# Patient Record
Sex: FEMALE | Race: BLACK OR AFRICAN AMERICAN | NOT HISPANIC OR LATINO | Employment: FULL TIME | URBAN - METROPOLITAN AREA
[De-identification: names, ages, dates, MRNs, and addresses within clinical notes are randomized per-mention and may not be internally consistent; named-entity substitution may affect disease eponyms.]

---

## 2018-12-12 PROBLEM — F51.01 PRIMARY INSOMNIA: Status: ACTIVE | Noted: 2018-12-12

## 2018-12-12 PROBLEM — N76.0 ACUTE VAGINITIS: Status: ACTIVE | Noted: 2018-12-12

## 2019-06-21 PROBLEM — Z00.00 ROUTINE GENERAL MEDICAL EXAMINATION AT A HEALTH CARE FACILITY: Status: ACTIVE | Noted: 2019-06-21

## 2019-06-21 PROBLEM — E88.810 INSULIN RESISTANCE SYNDROME: Status: ACTIVE | Noted: 2019-06-21

## 2019-06-21 PROBLEM — E55.9 VITAMIN D DEFICIENCY: Status: ACTIVE | Noted: 2019-06-21

## 2019-08-09 PROBLEM — R60.0 LOCALIZED EDEMA: Status: ACTIVE | Noted: 2019-08-09

## 2019-09-23 PROBLEM — Z00.00 ROUTINE GENERAL MEDICAL EXAMINATION AT A HEALTH CARE FACILITY: Status: RESOLVED | Noted: 2019-06-21 | Resolved: 2019-09-23

## 2021-11-16 ENCOUNTER — TELEPHONE (OUTPATIENT)
Dept: PULMONOLOGY | Facility: CLINIC | Age: 46
End: 2021-11-16
Payer: COMMERCIAL

## 2021-11-16 DIAGNOSIS — R05.9 COUGH: Primary | ICD-10-CM

## 2021-11-17 ENCOUNTER — HOSPITAL ENCOUNTER (OUTPATIENT)
Dept: RADIOLOGY | Facility: HOSPITAL | Age: 46
Discharge: HOME | End: 2021-11-17
Attending: INTERNAL MEDICINE
Payer: COMMERCIAL

## 2021-11-17 ENCOUNTER — OFFICE VISIT (OUTPATIENT)
Dept: PULMONOLOGY | Facility: CLINIC | Age: 46
End: 2021-11-17
Payer: COMMERCIAL

## 2021-11-17 VITALS
BODY MASS INDEX: 45.99 KG/M2 | OXYGEN SATURATION: 94 % | WEIGHT: 293 LBS | DIASTOLIC BLOOD PRESSURE: 86 MMHG | RESPIRATION RATE: 16 BRPM | SYSTOLIC BLOOD PRESSURE: 140 MMHG | HEIGHT: 67 IN | HEART RATE: 104 BPM

## 2021-11-17 DIAGNOSIS — G47.33 OSA (OBSTRUCTIVE SLEEP APNEA): Primary | ICD-10-CM

## 2021-11-17 DIAGNOSIS — E03.9 HYPOTHYROIDISM, UNSPECIFIED TYPE: ICD-10-CM

## 2021-11-17 DIAGNOSIS — E66.01 MORBID OBESITY: ICD-10-CM

## 2021-11-17 DIAGNOSIS — E88.810 INSULIN RESISTANCE SYNDROME: ICD-10-CM

## 2021-11-17 DIAGNOSIS — F51.01 PRIMARY INSOMNIA: ICD-10-CM

## 2021-11-17 DIAGNOSIS — R05.9 COUGH: ICD-10-CM

## 2021-11-17 PROCEDURE — 99999 PR PBB SHADOW E&M-EST. PATIENT-LVL V: ICD-10-PCS | Mod: PBBFAC,,, | Performed by: INTERNAL MEDICINE

## 2021-11-17 PROCEDURE — 99204 PR OFFICE/OUTPT VISIT, NEW, LEVL IV, 45-59 MIN: ICD-10-PCS | Mod: S$GLB,,, | Performed by: INTERNAL MEDICINE

## 2021-11-17 PROCEDURE — 71046 XR CHEST PA AND LATERAL: ICD-10-PCS | Mod: 26,,, | Performed by: RADIOLOGY

## 2021-11-17 PROCEDURE — 3077F PR MOST RECENT SYSTOLIC BLOOD PRESSURE >= 140 MM HG: ICD-10-PCS | Mod: CPTII,S$GLB,, | Performed by: INTERNAL MEDICINE

## 2021-11-17 PROCEDURE — 3061F PR NEG MICROALBUMINURIA RESULT DOCUMENTED/REVIEW: ICD-10-PCS | Mod: CPTII,S$GLB,, | Performed by: INTERNAL MEDICINE

## 2021-11-17 PROCEDURE — 1160F PR REVIEW ALL MEDS BY PRESCRIBER/CLIN PHARMACIST DOCUMENTED: ICD-10-PCS | Mod: CPTII,S$GLB,, | Performed by: INTERNAL MEDICINE

## 2021-11-17 PROCEDURE — 99204 OFFICE O/P NEW MOD 45 MIN: CPT | Mod: S$GLB,,, | Performed by: INTERNAL MEDICINE

## 2021-11-17 PROCEDURE — 1160F RVW MEDS BY RX/DR IN RCRD: CPT | Mod: CPTII,S$GLB,, | Performed by: INTERNAL MEDICINE

## 2021-11-17 PROCEDURE — 3079F DIAST BP 80-89 MM HG: CPT | Mod: CPTII,S$GLB,, | Performed by: INTERNAL MEDICINE

## 2021-11-17 PROCEDURE — 71046 X-RAY EXAM CHEST 2 VIEWS: CPT | Mod: TC

## 2021-11-17 PROCEDURE — 99999 PR PBB SHADOW E&M-EST. PATIENT-LVL V: CPT | Mod: PBBFAC,,, | Performed by: INTERNAL MEDICINE

## 2021-11-17 PROCEDURE — 71046 X-RAY EXAM CHEST 2 VIEWS: CPT | Mod: 26,,, | Performed by: RADIOLOGY

## 2021-11-17 PROCEDURE — 3066F PR DOCUMENTATION OF TREATMENT FOR NEPHROPATHY: ICD-10-PCS | Mod: CPTII,S$GLB,, | Performed by: INTERNAL MEDICINE

## 2021-11-17 PROCEDURE — 3008F BODY MASS INDEX DOCD: CPT | Mod: CPTII,S$GLB,, | Performed by: INTERNAL MEDICINE

## 2021-11-17 PROCEDURE — 1159F PR MEDICATION LIST DOCUMENTED IN MEDICAL RECORD: ICD-10-PCS | Mod: CPTII,S$GLB,, | Performed by: INTERNAL MEDICINE

## 2021-11-17 PROCEDURE — 3079F PR MOST RECENT DIASTOLIC BLOOD PRESSURE 80-89 MM HG: ICD-10-PCS | Mod: CPTII,S$GLB,, | Performed by: INTERNAL MEDICINE

## 2021-11-17 PROCEDURE — 1159F MED LIST DOCD IN RCRD: CPT | Mod: CPTII,S$GLB,, | Performed by: INTERNAL MEDICINE

## 2021-11-17 PROCEDURE — 3061F NEG MICROALBUMINURIA REV: CPT | Mod: CPTII,S$GLB,, | Performed by: INTERNAL MEDICINE

## 2021-11-17 PROCEDURE — 3008F PR BODY MASS INDEX (BMI) DOCUMENTED: ICD-10-PCS | Mod: CPTII,S$GLB,, | Performed by: INTERNAL MEDICINE

## 2021-11-17 PROCEDURE — 3066F NEPHROPATHY DOC TX: CPT | Mod: CPTII,S$GLB,, | Performed by: INTERNAL MEDICINE

## 2021-11-17 PROCEDURE — 3077F SYST BP >= 140 MM HG: CPT | Mod: CPTII,S$GLB,, | Performed by: INTERNAL MEDICINE

## 2021-11-18 ENCOUNTER — TELEPHONE (OUTPATIENT)
Dept: SLEEP MEDICINE | Facility: CLINIC | Age: 46
End: 2021-11-18
Payer: COMMERCIAL

## 2021-12-15 ENCOUNTER — PROCEDURE VISIT (OUTPATIENT)
Dept: SLEEP MEDICINE | Facility: CLINIC | Age: 46
End: 2021-12-15
Payer: COMMERCIAL

## 2021-12-15 DIAGNOSIS — G47.33 OSA (OBSTRUCTIVE SLEEP APNEA): ICD-10-CM

## 2021-12-19 ENCOUNTER — PATIENT MESSAGE (OUTPATIENT)
Dept: PULMONOLOGY | Facility: CLINIC | Age: 46
End: 2021-12-19
Payer: COMMERCIAL

## 2021-12-19 PROCEDURE — 95806 SLEEP STUDY UNATT&RESP EFFT: CPT | Mod: 26,S$GLB,, | Performed by: INTERNAL MEDICINE

## 2021-12-19 PROCEDURE — 95806 PR SLEEP STUDY, UNATTENDED, SIMUL RECORD HR/O2 SAT/RESP FLOW/RESP EFFT: ICD-10-PCS | Mod: 26,S$GLB,, | Performed by: INTERNAL MEDICINE

## 2021-12-20 ENCOUNTER — TELEPHONE (OUTPATIENT)
Dept: PULMONOLOGY | Facility: CLINIC | Age: 46
End: 2021-12-20
Payer: COMMERCIAL

## 2021-12-20 ENCOUNTER — PATIENT MESSAGE (OUTPATIENT)
Dept: PULMONOLOGY | Facility: CLINIC | Age: 46
End: 2021-12-20
Payer: COMMERCIAL

## 2021-12-20 DIAGNOSIS — G47.33 OSA (OBSTRUCTIVE SLEEP APNEA): Primary | ICD-10-CM

## 2021-12-20 NOTE — TELEPHONE ENCOUNTER
Orders Placed This Encounter   Procedures    [facility] Patient Entered CPAP Usage     Order Specific Question:   Patient consents to share Tuan CPAP usage and settings data with [facility]?     Answer:   Yes    CPAP FOR HOME USE     Order Specific Question:   Length of need (1-99 months):     Answer:   99     Order Specific Question:   Fulfillment Priority:     Answer:   Level 4:  all others     Comments:   AHI 37.7     Order Specific Question:   Type ():     Answer:   Auto CPAP     Order Specific Question:   Auto CPAP pressure setting range (cmH20):     Answer:   6-20     Order Specific Question:   Humidification ():     Answer:   Heated     Order Specific Question:   Choose ONE mask type and its corresponding cushions and/or pillows:     Answer:    Nasal Cushion Mask, 1 per 90 days:  Nasal Cushions, (6 per 90 days)     Order Specific Question:   Choose EITHER Heated or Non-Heated Tubjing     Answer:    Non-Heated Tubing, 1 per 90 days     Order Specific Question:   All other supplies as needed as listed below:     Answer:    Headgear, 1 per 180 days     Order Specific Question:   All other supplies as needed as listed below:     Answer:    Chin Strap, 1 per 180 days     Order Specific Question:   All other supplies as needed as listed below:     Answer:    Disposable Filter, 6 per 90 days     Order Specific Question:   All other supplies as needed as listed below:     Answer:    Non-Disposable Filter, 1 per 180 days     Order Specific Question:   All other supplies as needed as listed below:     Answer:    Humidifier Chamber, 1 per 180 days    CPAP/BIPAP SUPPLIES     Order Specific Question:   Length of need (1-99 months):     Answer:   99     Order Specific Question:   Choose ONE mask type and its corresponding cushions and/or pillows:     Answer:    Nasal Cushion Mask, 1 per 90 days:  Nasal Cushions, (6 per 90 days)     Order Specific Question:    Choose EITHER Heated or Non-Heated Tubjing     Answer:    Non-Heated Tubing, 1 per 90 days     Order Specific Question:   All other supplies as needed as listed below:     Answer:    Headgear, 1 per 180 days     Order Specific Question:   All other supplies as needed as listed below:     Answer:    Chin Strap, 1 per 180 days     Order Specific Question:   All other supplies as needed as listed below:     Answer:    Disposable Filter, 6 per 90 days     Order Specific Question:   All other supplies as needed as listed below:     Answer:    Non-Disposable Filter, 1 per 180 days     Order Specific Question:   All other supplies as needed as listed below:     Answer:    Humidifier Chamber, 1 per 180 days

## 2022-02-16 ENCOUNTER — PATIENT MESSAGE (OUTPATIENT)
Dept: PULMONOLOGY | Facility: CLINIC | Age: 47
End: 2022-02-16
Payer: COMMERCIAL

## 2022-02-23 DIAGNOSIS — D84.9 IMMUNOSUPPRESSED STATUS: ICD-10-CM

## 2022-03-22 ENCOUNTER — TELEPHONE (OUTPATIENT)
Dept: PULMONOLOGY | Facility: CLINIC | Age: 47
End: 2022-03-22
Payer: COMMERCIAL

## 2022-03-22 ENCOUNTER — PATIENT MESSAGE (OUTPATIENT)
Dept: PULMONOLOGY | Facility: CLINIC | Age: 47
End: 2022-03-22
Payer: COMMERCIAL

## 2022-05-24 ENCOUNTER — OFFICE VISIT (OUTPATIENT)
Dept: PULMONOLOGY | Facility: CLINIC | Age: 47
End: 2022-05-24
Payer: COMMERCIAL

## 2022-05-24 VITALS
SYSTOLIC BLOOD PRESSURE: 140 MMHG | DIASTOLIC BLOOD PRESSURE: 82 MMHG | BODY MASS INDEX: 45.99 KG/M2 | RESPIRATION RATE: 19 BRPM | HEART RATE: 82 BPM | WEIGHT: 293 LBS | HEIGHT: 67 IN | OXYGEN SATURATION: 94 %

## 2022-05-24 DIAGNOSIS — E03.8 OTHER SPECIFIED HYPOTHYROIDISM: ICD-10-CM

## 2022-05-24 DIAGNOSIS — E66.01 MORBID OBESITY: ICD-10-CM

## 2022-05-24 DIAGNOSIS — G47.33 OSA (OBSTRUCTIVE SLEEP APNEA): ICD-10-CM

## 2022-05-24 DIAGNOSIS — G47.33 OSA ON CPAP: Primary | ICD-10-CM

## 2022-05-24 PROCEDURE — 3077F PR MOST RECENT SYSTOLIC BLOOD PRESSURE >= 140 MM HG: ICD-10-PCS | Mod: CPTII,S$GLB,, | Performed by: PHYSICIAN ASSISTANT

## 2022-05-24 PROCEDURE — 3079F PR MOST RECENT DIASTOLIC BLOOD PRESSURE 80-89 MM HG: ICD-10-PCS | Mod: CPTII,S$GLB,, | Performed by: PHYSICIAN ASSISTANT

## 2022-05-24 PROCEDURE — 1160F RVW MEDS BY RX/DR IN RCRD: CPT | Mod: CPTII,S$GLB,, | Performed by: PHYSICIAN ASSISTANT

## 2022-05-24 PROCEDURE — 1159F PR MEDICATION LIST DOCUMENTED IN MEDICAL RECORD: ICD-10-PCS | Mod: CPTII,S$GLB,, | Performed by: PHYSICIAN ASSISTANT

## 2022-05-24 PROCEDURE — 3066F NEPHROPATHY DOC TX: CPT | Mod: CPTII,S$GLB,, | Performed by: PHYSICIAN ASSISTANT

## 2022-05-24 PROCEDURE — 3077F SYST BP >= 140 MM HG: CPT | Mod: CPTII,S$GLB,, | Performed by: PHYSICIAN ASSISTANT

## 2022-05-24 PROCEDURE — 3061F NEG MICROALBUMINURIA REV: CPT | Mod: CPTII,S$GLB,, | Performed by: PHYSICIAN ASSISTANT

## 2022-05-24 PROCEDURE — 99214 OFFICE O/P EST MOD 30 MIN: CPT | Mod: S$GLB,,, | Performed by: PHYSICIAN ASSISTANT

## 2022-05-24 PROCEDURE — 1160F PR REVIEW ALL MEDS BY PRESCRIBER/CLIN PHARMACIST DOCUMENTED: ICD-10-PCS | Mod: CPTII,S$GLB,, | Performed by: PHYSICIAN ASSISTANT

## 2022-05-24 PROCEDURE — 3008F PR BODY MASS INDEX (BMI) DOCUMENTED: ICD-10-PCS | Mod: CPTII,S$GLB,, | Performed by: PHYSICIAN ASSISTANT

## 2022-05-24 PROCEDURE — 3079F DIAST BP 80-89 MM HG: CPT | Mod: CPTII,S$GLB,, | Performed by: PHYSICIAN ASSISTANT

## 2022-05-24 PROCEDURE — 3008F BODY MASS INDEX DOCD: CPT | Mod: CPTII,S$GLB,, | Performed by: PHYSICIAN ASSISTANT

## 2022-05-24 PROCEDURE — 3061F PR NEG MICROALBUMINURIA RESULT DOCUMENTED/REVIEW: ICD-10-PCS | Mod: CPTII,S$GLB,, | Performed by: PHYSICIAN ASSISTANT

## 2022-05-24 PROCEDURE — 3066F PR DOCUMENTATION OF TREATMENT FOR NEPHROPATHY: ICD-10-PCS | Mod: CPTII,S$GLB,, | Performed by: PHYSICIAN ASSISTANT

## 2022-05-24 PROCEDURE — 1159F MED LIST DOCD IN RCRD: CPT | Mod: CPTII,S$GLB,, | Performed by: PHYSICIAN ASSISTANT

## 2022-05-24 PROCEDURE — 99999 PR PBB SHADOW E&M-EST. PATIENT-LVL IV: ICD-10-PCS | Mod: PBBFAC,,, | Performed by: PHYSICIAN ASSISTANT

## 2022-05-24 PROCEDURE — 99999 PR PBB SHADOW E&M-EST. PATIENT-LVL IV: CPT | Mod: PBBFAC,,, | Performed by: PHYSICIAN ASSISTANT

## 2022-05-24 PROCEDURE — 99214 PR OFFICE/OUTPT VISIT, EST, LEVL IV, 30-39 MIN: ICD-10-PCS | Mod: S$GLB,,, | Performed by: PHYSICIAN ASSISTANT

## 2022-05-24 NOTE — PROGRESS NOTES
Subjective:       Patient ID: Jolene Monaco is a 46 y.o. female.    Chief Complaint: Sleep Apnea    47yo female here for PERRI on CPAP follow up  Using AutoPAP, FFM  Patient states improved symptoms with use of CPAP. Sleeping more soundly. Waking up feeling more refreshed. Improved daytime sleepiness.  Using nightly  Wants to switch to nasal pillow  definitely wants to keep using CPAP  Mission improved from 21 to 0    11/2021  Jolene Monaco is 46 y.o.  Referred by Minal Isabel MD for PERRI/sleep eval  Patient complains of tossing and turning at night, fragmented sleep  Complains of daytime somnolence, fatigue, snoring  Takes lunesta for sleep and recently added on trazodone after father passed and symptoms worsened  In bed at 10pm and up at 5am  No restless legs  No suspicion for narcolepsy  No alcohol  Patient is a highschool teacher     BP Readings from Last 3 Encounters:   05/24/22 (!) 140/82   11/17/21 (!) 140/86   10/19/21 136/88     Snoring / Sleep:     [place] Questionnaire (validated PERRI screening questionnaire)    yes -- Snoring/apnea    yes -- Fatigue    Body mass index is 56.97 kg/m².  (>25 is overweight, >30 is obese)    Blood Pressure = Hypertension  (PreHTN 120-139/80-89, Stg1 140-159/90-99, Stg2 >160/>100)  [place] = 3 of three PERRI categories are positive (high risk is 2-3 positive categories)     [place] Sleepiness Scale TOTAL = 0    (validated sleepiness questionnaire with a higher score indicating greater sleepiness; range 0-24)  EPWORTH SLEEPINESS SCALE 5/24/2022   Sitting and reading 0   Watching TV 0   Sitting, inactive in a public place (e.g. a theatre or a meeting) 0   As a passenger in a car for an hour without a break 0   Lying down to rest in the afternoon when circumstances permit 0   Sitting and talking to someone 0   Sitting quietly after a lunch without alcohol 0   In a car, while stopped for a few minutes in traffic 0   Total score 0         Immunization History   Administered  Date(s) Administered    COVID-19, MRNA, LN-S, PF (Pfizer) (Purple Cap) 05/15/2021, 06/15/2021    DTP 1974, 08/13/1975, 10/06/1975, 09/28/1977, 11/09/1978    Influenza - Quadrivalent 10/29/2020    MMR 03/17/1977, 12/23/2004    OPV 1974, 08/13/1975, 10/06/1975, 09/28/1977, 11/09/1978    Tdap 12/23/2004     Tobacco Use: Low Risk     Smoking Tobacco Use: Never Smoker    Smokeless Tobacco Use: Never Used     Active Ambulatory Problems     Diagnosis Date Noted    Primary insomnia 12/12/2018    Acute vaginitis 12/12/2018    Chronic lymphocytic thyroiditis 06/12/2012    Hypothyroidism 06/12/2012    Iron deficiency anemia 06/12/2012    Morbid obesity 06/12/2012    PUD (peptic ulcer disease) 06/12/2012    Insulin resistance syndrome 06/21/2019    Vitamin D deficiency 06/21/2019    Localized edema 08/09/2019    PERRI on CPAP 11/17/2021     Resolved Ambulatory Problems     Diagnosis Date Noted    No Resolved Ambulatory Problems     Past Medical History:   Diagnosis Date    Insulin resistance      Current Outpatient Medications on File Prior to Visit   Medication Sig Dispense Refill    benzonatate (TESSALON) 200 MG capsule TAKE 1 CAPSULE BY MOUTH THREE TIMES DAILY FOR 10 DAYS AS NEEDED FOR COUGH      cyclobenzaprine (FLEXERIL) 10 MG tablet TAKE 1 TABLET BY MOUTH 3 TIMES DAILY AS NEEDED FOR MUSCLE SPASMS FOR UP TO 5 DAYS      desonide 0.05% (DESOWEN) 0.05 % Oint APPLY TOPICALLY TWICE DAILY AS NEEDED 60 g 2    dorzolamide-timolol 2-0.5% (COSOPT) 22.3-6.8 mg/mL ophthalmic solution Place 1 drop into both eyes 2 (two) times a day.      ergocalciferol (ERGOCALCIFEROL) 50,000 unit Cap Take 1 capsule (50,000 Units total) by mouth every 7 days. 12 capsule 1    eszopiclone (LUNESTA) 2 MG Tab Take 1 tablet (2 mg total) by mouth every evening. 30 tablet 0    fexofenadine (ALLEGRA) 180 MG tablet Take 180 mg by mouth daily as needed.      fluticasone (FLONASE) 50 mcg/actuation nasal spray 1 spray by Each  "Nare route daily as needed.      GLUCOPHAGE  mg ER 24hr tablet 2 tabs daily with meals. 60 tablet 3    levocetirizine (XYZAL) 5 MG tablet Take 5 mg by mouth once daily.      meloxicam (MOBIC) 7.5 MG tablet Take 7.5 mg by mouth once daily.      OZEMPIC 0.25 mg or 0.5 mg(2 mg/1.5 mL) PnIj       prednisoLONE acetate (PRED FORTE) 1 % DrpS INT 1 GTT IN OD QID  6    SYNTHROID 200 mcg tablet       traZODone (DESYREL) 50 MG tablet Take 1 tablet (50 mg total) by mouth nightly as needed for Insomnia. 30 tablet 3     No current facility-administered medications on file prior to visit.     Review of Systems   Constitutional: Negative for fever, weight loss, appetite change, fatigue and weakness.   HENT: Negative for trouble swallowing and congestion.    Respiratory: Negative for apnea, snoring, cough, sputum production, choking, chest tightness, shortness of breath, wheezing, dyspnea on extertion and somnolence.    Cardiovascular: Negative for chest pain and leg swelling.   Musculoskeletal: Negative for gait problem and joint swelling.   Gastrointestinal: Negative for nausea, vomiting and abdominal pain.   Neurological: Negative for dizziness, weakness and headaches.   All other systems reviewed and are negative.      Objective:       Vitals:    05/24/22 1501   BP: (!) 140/82   Pulse: 82   Resp: 19   SpO2: (!) 94%   Weight: (!) 165 kg (363 lb 12.1 oz)   Height: 5' 7" (1.702 m)     Physical Exam   Constitutional: She is oriented to person, place, and time. She appears well-developed and well-nourished. No distress. She is obese.   HENT:   Head: Normocephalic.   Nose: Nose normal.   Mouth/Throat: Oropharynx is clear and moist. Mallampati Score: III.   Neck:   Neck circumference 16.5inch   Cardiovascular: Normal rate and regular rhythm.   Pulmonary/Chest: Effort normal and breath sounds normal. No respiratory distress. She has no wheezes. She has no rhonchi. She has no rales.   Musculoskeletal:         General: No " edema.      Cervical back: Normal range of motion and neck supple.   Lymphadenopathy: No supraclavicular adenopathy is present.     She has no cervical adenopathy.     She has no axillary adenopathy.   Neurological: She is alert and oriented to person, place, and time. Gait normal.   Skin: Skin is warm and dry.   Psychiatric: She has a normal mood and affect.   Vitals reviewed.    EXAMINATION:  XR CHEST PA AND LATERAL     CLINICAL HISTORY:  Cough, unspecified     TECHNIQUE:  PA and lateral views of the chest were performed.     COMPARISON:  None     FINDINGS:  Lungs are clear without focal consolidation, edema, or mass.  No pneumothorax or pleural effusion.  Mild cardiomegaly.  Pulmonary vasculature is mildly prominent bilaterally.  No acute osseous abnormality.     Impression:     As above.        Electronically signed by: Marvel Guadarrama  Date:                                            11/17/2021  Time:                                           16:44  I personally reviewed xray imaging and agree with radiology report. No acute abnormalities noted.    Assessment:       Problem List Items Addressed This Visit        Endocrine    Hypothyroidism     Synthroid             Morbid obesity     Weight loss and exercise to improve overall health.                Other    PERRI on CPAP - Primary     Benefits from use and compliant  AHI 3.1 with use  Nasal pillow ordered  Discussed therapeutic goals for CPAP: Ideal usage 100% of nights for 6-8 hours per night. Minimum usage is 70% of night for at least 4 hours per night.             Relevant Orders    CPAP/BIPAP SUPPLIES            Plan:         Follow up in about 1 year (around 5/24/2023) for PERRI follow up.    This note was prepared using voice recognition system and is likely to have sound alike errors that may have been overlooked even after proof reading.  Please call me with any questions    Discussed diagnosis, its evaluation, treatment and usual course. All questions  answered.    Thank you for the courtesy of participating in the care of this patient    Herminia Millan PA-C

## 2022-05-24 NOTE — ASSESSMENT & PLAN NOTE
Benefits from use and compliant  AHI 3.1 with use  Nasal pillow ordered  Discussed therapeutic goals for CPAP: Ideal usage 100% of nights for 6-8 hours per night. Minimum usage is 70% of night for at least 4 hours per night.

## 2022-06-02 ENCOUNTER — PATIENT MESSAGE (OUTPATIENT)
Dept: PULMONOLOGY | Facility: CLINIC | Age: 47
End: 2022-06-02
Payer: COMMERCIAL

## 2023-03-22 PROBLEM — Z98.890 STATUS POST DILATION AND CURETTAGE: Status: ACTIVE | Noted: 2023-03-22

## 2023-03-22 PROBLEM — Z98.890 STATUS POST ENDOMETRIAL ABLATION: Status: ACTIVE | Noted: 2023-03-22

## 2023-04-12 ENCOUNTER — PATIENT MESSAGE (OUTPATIENT)
Dept: GASTROENTEROLOGY | Facility: CLINIC | Age: 48
End: 2023-04-12
Payer: COMMERCIAL

## 2023-05-31 ENCOUNTER — OFFICE VISIT (OUTPATIENT)
Dept: GASTROENTEROLOGY | Facility: CLINIC | Age: 48
End: 2023-05-31
Payer: COMMERCIAL

## 2023-05-31 VITALS
SYSTOLIC BLOOD PRESSURE: 138 MMHG | HEIGHT: 66 IN | DIASTOLIC BLOOD PRESSURE: 72 MMHG | WEIGHT: 293 LBS | BODY MASS INDEX: 47.09 KG/M2

## 2023-05-31 DIAGNOSIS — R06.02 SHORTNESS OF BREATH: ICD-10-CM

## 2023-05-31 DIAGNOSIS — R07.9 CHEST PAIN, UNSPECIFIED TYPE: Primary | ICD-10-CM

## 2023-05-31 DIAGNOSIS — Z12.11 COLON CANCER SCREENING: ICD-10-CM

## 2023-05-31 DIAGNOSIS — E66.01 MORBID OBESITY: ICD-10-CM

## 2023-05-31 PROCEDURE — 3008F BODY MASS INDEX DOCD: CPT | Mod: CPTII,S$GLB,, | Performed by: PHYSICIAN ASSISTANT

## 2023-05-31 PROCEDURE — 3061F NEG MICROALBUMINURIA REV: CPT | Mod: CPTII,S$GLB,, | Performed by: PHYSICIAN ASSISTANT

## 2023-05-31 PROCEDURE — 3075F SYST BP GE 130 - 139MM HG: CPT | Mod: CPTII,S$GLB,, | Performed by: PHYSICIAN ASSISTANT

## 2023-05-31 PROCEDURE — 1159F MED LIST DOCD IN RCRD: CPT | Mod: CPTII,S$GLB,, | Performed by: PHYSICIAN ASSISTANT

## 2023-05-31 PROCEDURE — 99203 PR OFFICE/OUTPT VISIT, NEW, LEVL III, 30-44 MIN: ICD-10-PCS | Mod: S$GLB,,, | Performed by: PHYSICIAN ASSISTANT

## 2023-05-31 PROCEDURE — 3075F PR MOST RECENT SYSTOLIC BLOOD PRESS GE 130-139MM HG: ICD-10-PCS | Mod: CPTII,S$GLB,, | Performed by: PHYSICIAN ASSISTANT

## 2023-05-31 PROCEDURE — 99203 OFFICE O/P NEW LOW 30 MIN: CPT | Mod: S$GLB,,, | Performed by: PHYSICIAN ASSISTANT

## 2023-05-31 PROCEDURE — 99999 PR PBB SHADOW E&M-EST. PATIENT-LVL IV: ICD-10-PCS | Mod: PBBFAC,,, | Performed by: PHYSICIAN ASSISTANT

## 2023-05-31 PROCEDURE — 3078F DIAST BP <80 MM HG: CPT | Mod: CPTII,S$GLB,, | Performed by: PHYSICIAN ASSISTANT

## 2023-05-31 PROCEDURE — 3066F PR DOCUMENTATION OF TREATMENT FOR NEPHROPATHY: ICD-10-PCS | Mod: CPTII,S$GLB,, | Performed by: PHYSICIAN ASSISTANT

## 2023-05-31 PROCEDURE — 3044F HG A1C LEVEL LT 7.0%: CPT | Mod: CPTII,S$GLB,, | Performed by: PHYSICIAN ASSISTANT

## 2023-05-31 PROCEDURE — 99999 PR PBB SHADOW E&M-EST. PATIENT-LVL IV: CPT | Mod: PBBFAC,,, | Performed by: PHYSICIAN ASSISTANT

## 2023-05-31 PROCEDURE — 3066F NEPHROPATHY DOC TX: CPT | Mod: CPTII,S$GLB,, | Performed by: PHYSICIAN ASSISTANT

## 2023-05-31 PROCEDURE — 1159F PR MEDICATION LIST DOCUMENTED IN MEDICAL RECORD: ICD-10-PCS | Mod: CPTII,S$GLB,, | Performed by: PHYSICIAN ASSISTANT

## 2023-05-31 PROCEDURE — 3008F PR BODY MASS INDEX (BMI) DOCUMENTED: ICD-10-PCS | Mod: CPTII,S$GLB,, | Performed by: PHYSICIAN ASSISTANT

## 2023-05-31 PROCEDURE — 3061F PR NEG MICROALBUMINURIA RESULT DOCUMENTED/REVIEW: ICD-10-PCS | Mod: CPTII,S$GLB,, | Performed by: PHYSICIAN ASSISTANT

## 2023-05-31 PROCEDURE — 3044F PR MOST RECENT HEMOGLOBIN A1C LEVEL <7.0%: ICD-10-PCS | Mod: CPTII,S$GLB,, | Performed by: PHYSICIAN ASSISTANT

## 2023-05-31 PROCEDURE — 3078F PR MOST RECENT DIASTOLIC BLOOD PRESSURE < 80 MM HG: ICD-10-PCS | Mod: CPTII,S$GLB,, | Performed by: PHYSICIAN ASSISTANT

## 2023-05-31 NOTE — PROGRESS NOTES
"Clinic Consult:  [facility] Consultation Note    Reason for Consult:  The primary encounter diagnosis was Chest pain, unspecified type. Diagnoses of Colon cancer screening, Morbid obesity, and Shortness of breath were also pertinent to this visit.    PCP: Minal Isabel   [id number] / [place] [id number]    CC: No chief complaint on file.      HPI:  This is a 47 y.o. female here for evaluation of the above. In need of screening colonoscopy. Has never had a colonoscopy in the past. Denies family history of CRC. Denies abdominal pain, change in bowel habits, blood int he stool, abnormal weight loss.    Reports going to the ED a few days ago for chest pain - described as an "elephant dancing on her chest". EKG, vitals and labs were ok per patient. Was given a GI cocktail and told could be GI related. Morphine helped her pain, not as much the GI cocktail. States she had a couple of episodes the week prior where she was having a hard time catching her breath. Severe chest pain has resolved, but she continues to feel some discomfort. D    Outside of this event, no other GI complaints.   Still has some remnant chest discomfort that radiates into her left shoulder blade.       Review of Systems   Constitutional:  Negative for activity change, appetite change, chills, diaphoresis, fatigue, fever and unexpected weight change.   HENT:  Negative for trouble swallowing.    Respiratory:  Positive for shortness of breath. Negative for cough and choking.    Cardiovascular:  Positive for chest pain.   Gastrointestinal:  Negative for abdominal distention, abdominal pain, anal bleeding, blood in stool, constipation, diarrhea, nausea and vomiting.   Musculoskeletal:  Positive for back pain.   Skin:  Negative for color change and pallor.   Neurological:  Negative for dizziness, weakness and headaches.   Psychiatric/Behavioral:  Negative for dysphoric mood. The patient is nervous/anxious.       Medical History: "   Past Medical History:   Diagnosis Date    Hypothyroidism     Insulin resistance     PERRI (obstructive sleep apnea)     Vitamin D deficiency        Surgical History:   Past Surgical History:   Procedure Laterality Date    CARPAL TUNNEL RELEASE Left      SECTION      x2    ENDOMETRIAL ABLATION      EYE SURGERY  2019    Right eye cornea transplant       Family History:    Family History   Problem Relation Age of Onset    Diabetes Father     Heart disease Father     Hypertension Father     Cancer Maternal Grandfather     Diabetes Paternal Grandmother        Social History:   Social History     Socioeconomic History    Marital status:    Occupational History    Occupation: teacher   Tobacco Use    Smoking status: Never    Smokeless tobacco: Never   Substance and Sexual Activity    Alcohol use: Yes     Comment: socially    Drug use: No    Sexual activity: Yes       Allergies:   Review of patient's allergies indicates:   Allergen Reactions    Levaquin [levofloxacin]        Home Medications:   Current Outpatient Medications on File Prior to Visit   Medication Sig Dispense Refill    desonide 0.05% (DESOWEN) 0.05 % Oint APPLY TOPICALLY TWICE DAILY AS NEEDED 60 g 2    ferrous sulfate (FEOSOL) 325 mg (65 mg iron) Tab tablet Take 1 tablet (325 mg total) by mouth daily with breakfast. 90 tablet 1    levothyroxine (SYNTHROID) 200 MCG tablet Take 1 tablet (200 mcg total) by mouth before breakfast. 90 tablet 1    levothyroxine (SYNTHROID) 25 MCG tablet Take 1 tablet (25 mcg total) by mouth before breakfast. 90 tablet 1    semaglutide (OZEMPIC) 0.25 mg or 0.5 mg(2 mg/1.5 mL) pen injector Inject 0.25 mg into the skin every 7 days. 2.25 mL 1    ergocalciferol (ERGOCALCIFEROL) 50,000 unit Cap Take 1 capsule (50,000 Units total) by mouth every 7 days. (Patient not taking: Reported on 2023) 12 capsule 1    metFORMIN (GLUCOPHAGE-XR) 500 MG ER 24hr tablet Take 2 tablets (1,000 mg total) by mouth Daily. Take 2  "tablets (1,000 mg total) by mouth once daily at 6am. (Patient not taking: Reported on 5/31/2023) 180 tablet 1     No current facility-administered medications on file prior to visit.       Physical Exam:  /72   Ht 5' 6" (1.676 m)   Wt (!) 162.9 kg (359 lb 3.8 oz)   BMI 57.98 kg/m²   Body mass index is 57.98 kg/m².  Physical Exam  Constitutional:       General: She is not in acute distress.     Appearance: Normal appearance. She is obese. She is not ill-appearing, toxic-appearing or diaphoretic.   HENT:      Head: Normocephalic and atraumatic.   Eyes:      General: No scleral icterus.     Extraocular Movements: Extraocular movements intact.   Cardiovascular:      Rate and Rhythm: Normal rate and regular rhythm.   Pulmonary:      Effort: Pulmonary effort is normal. No respiratory distress.      Breath sounds: Normal breath sounds.   Abdominal:      General: Bowel sounds are normal. There is no distension.      Palpations: Abdomen is soft.      Tenderness: There is no abdominal tenderness. There is no guarding.   Musculoskeletal:         General: Normal range of motion.      Cervical back: Normal range of motion.   Skin:     General: Skin is warm and dry.      Coloration: Skin is not jaundiced or pale.   Neurological:      Mental Status: She is alert and oriented to person, place, and time.         Labs: Pertinent labs reviewed.  Endoscopy: --  CRC Screening: --  Anticoagulation: --    Assessment:  1. Chest pain, unspecified type    2. Colon cancer screening    3. Morbid obesity    4. Shortness of breath         Recommendations:  -Patient reports today to discuss scheduling for screening colonoscopy, however, given her recent complaints of chest pain and episodes of SOB, along with risk factor of obesity, would prefer she be evaluated by cardiology prior to scheduling. Patient reports that she will schedule an appointment with Dr. Garnett at [facility].   -Instructed to let us know as soon as appointment has " been completed so we can request clearance for procedure. She verbalizes understanding.     Chest pain, unspecified type    Colon cancer screening  -     Ambulatory referral/consult to Gastroenterology    Morbid obesity    Shortness of breath        No follow-ups on file.    Thank you so much for allowing me to participate in the care of Jolene Dc PA-C

## 2023-06-30 ENCOUNTER — PATIENT MESSAGE (OUTPATIENT)
Dept: GASTROENTEROLOGY | Facility: CLINIC | Age: 48
End: 2023-06-30
Payer: COMMERCIAL

## 2023-07-03 ENCOUNTER — TELEPHONE (OUTPATIENT)
Dept: PREADMISSION TESTING | Facility: HOSPITAL | Age: 48
End: 2023-07-03
Payer: COMMERCIAL

## 2023-07-05 ENCOUNTER — PATIENT MESSAGE (OUTPATIENT)
Dept: PREADMISSION TESTING | Facility: HOSPITAL | Age: 48
End: 2023-07-05
Payer: COMMERCIAL

## 2023-07-05 ENCOUNTER — TELEPHONE (OUTPATIENT)
Dept: PREADMISSION TESTING | Facility: HOSPITAL | Age: 48
End: 2023-07-05
Payer: COMMERCIAL

## 2023-07-05 DIAGNOSIS — Z12.11 COLON CANCER SCREENING: Primary | ICD-10-CM

## 2023-07-05 RX ORDER — SODIUM, POTASSIUM,MAG SULFATES 17.5-3.13G
1 SOLUTION, RECONSTITUTED, ORAL ORAL DAILY
Qty: 1 KIT | Refills: 0 | Status: SHIPPED | OUTPATIENT
Start: 2023-07-05 | End: 2023-07-07

## 2023-07-27 ENCOUNTER — ANESTHESIA (OUTPATIENT)
Dept: ENDOSCOPY | Facility: HOSPITAL | Age: 48
End: 2023-07-27
Payer: COMMERCIAL

## 2023-07-27 ENCOUNTER — ANESTHESIA EVENT (OUTPATIENT)
Dept: ENDOSCOPY | Facility: HOSPITAL | Age: 48
End: 2023-07-27
Payer: COMMERCIAL

## 2023-07-27 ENCOUNTER — HOSPITAL ENCOUNTER (OUTPATIENT)
Facility: HOSPITAL | Age: 48
Discharge: HOME | End: 2023-07-27
Attending: INTERNAL MEDICINE | Admitting: INTERNAL MEDICINE
Payer: COMMERCIAL

## 2023-07-27 DIAGNOSIS — Z12.11 COLON CANCER SCREENING: ICD-10-CM

## 2023-07-27 LAB — POCT GLUCOSE: 86 MG/DL (ref 70–110)

## 2023-07-27 PROCEDURE — 25000003 PHARM REV CODE 250

## 2023-07-27 PROCEDURE — 88305 TISSUE EXAM BY PATHOLOGIST: CPT | Performed by: PATHOLOGY

## 2023-07-27 PROCEDURE — 63600175 PHARM REV CODE 636 W HCPCS

## 2023-07-27 PROCEDURE — 45385 COLONOSCOPY W/LESION REMOVAL: CPT | Mod: PT | Performed by: INTERNAL MEDICINE

## 2023-07-27 PROCEDURE — 25000003 PHARM REV CODE 250: Performed by: INTERNAL MEDICINE

## 2023-07-27 PROCEDURE — 88305 TISSUE EXAM BY PATHOLOGIST: ICD-10-PCS | Mod: 26,,, | Performed by: PATHOLOGY

## 2023-07-27 PROCEDURE — 45385 COLONOSCOPY W/LESION REMOVAL: CPT | Mod: 33,,, | Performed by: INTERNAL MEDICINE

## 2023-07-27 PROCEDURE — 88305 TISSUE EXAM BY PATHOLOGIST: CPT | Mod: 26,,, | Performed by: PATHOLOGY

## 2023-07-27 PROCEDURE — 37000009 HC ANESTHESIA EA ADD 15 MINS: Performed by: INTERNAL MEDICINE

## 2023-07-27 PROCEDURE — 37000008 HC ANESTHESIA 1ST 15 MINUTES: Performed by: INTERNAL MEDICINE

## 2023-07-27 PROCEDURE — 27201089 HC SNARE, DISP (ANY): Performed by: INTERNAL MEDICINE

## 2023-07-27 PROCEDURE — 45385 PR COLONOSCOPY,REMV LESN,SNARE: ICD-10-PCS | Mod: 33,,, | Performed by: INTERNAL MEDICINE

## 2023-07-27 RX ORDER — LIDOCAINE HYDROCHLORIDE 10 MG/ML
INJECTION, SOLUTION EPIDURAL; INFILTRATION; INTRACAUDAL; PERINEURAL
Status: DISCONTINUED | OUTPATIENT
Start: 2023-07-27 | End: 2023-07-27

## 2023-07-27 RX ORDER — SODIUM CHLORIDE, SODIUM LACTATE, POTASSIUM CHLORIDE, CALCIUM CHLORIDE 600; 310; 30; 20 MG/100ML; MG/100ML; MG/100ML; MG/100ML
INJECTION, SOLUTION INTRAVENOUS CONTINUOUS PRN
Status: DISCONTINUED | OUTPATIENT
Start: 2023-07-27 | End: 2023-07-27

## 2023-07-27 RX ORDER — PROPOFOL 10 MG/ML
VIAL (ML) INTRAVENOUS
Status: DISCONTINUED | OUTPATIENT
Start: 2023-07-27 | End: 2023-07-27

## 2023-07-27 RX ORDER — DEXTROMETHORPHAN/PSEUDOEPHED 2.5-7.5/.8
DROPS ORAL
Status: COMPLETED | OUTPATIENT
Start: 2023-07-27 | End: 2023-07-27

## 2023-07-27 RX ORDER — ASPIRIN 81 MG/1
81 TABLET ORAL DAILY
COMMUNITY

## 2023-07-27 RX ADMIN — LIDOCAINE HYDROCHLORIDE 50 MG: 10 SOLUTION INTRAVENOUS at 10:07

## 2023-07-27 RX ADMIN — PROPOFOL 50 MG: 10 INJECTION, EMULSION INTRAVENOUS at 10:07

## 2023-07-27 RX ADMIN — PROPOFOL 90 MG: 10 INJECTION, EMULSION INTRAVENOUS at 10:07

## 2023-07-27 RX ADMIN — PROPOFOL 40 MG: 10 INJECTION, EMULSION INTRAVENOUS at 10:07

## 2023-07-27 RX ADMIN — SODIUM CHLORIDE, SODIUM LACTATE, POTASSIUM CHLORIDE, AND CALCIUM CHLORIDE: .6; .31; .03; .02 INJECTION, SOLUTION INTRAVENOUS at 10:07

## 2023-07-27 NOTE — ANESTHESIA PREPROCEDURE EVALUATION
2023  Jolene Monaco is a 47 y.o., female.    Patient Active Problem List   Diagnosis    Primary insomnia    Acute vaginitis    Chronic lymphocytic thyroiditis    Hypothyroidism    Iron deficiency anemia    Morbid obesity    PUD (peptic ulcer disease)    Insulin resistance syndrome    Vitamin D deficiency    Localized edema    PERRI on CPAP    Status post dilation and curettage    Status post endometrial ablation     Past Medical History:   Diagnosis Date    Hypothyroidism     Insulin resistance     PERRI (obstructive sleep apnea)     Vitamin D deficiency      Past Surgical History:   Procedure Laterality Date    CARPAL TUNNEL RELEASE Left      SECTION      x2    ENDOMETRIAL ABLATION      EYE SURGERY  2019    Right eye cornea transplant         Pre-op Assessment    I have reviewed the Patient Summary Reports.     I have reviewed the Nursing Notes. I have reviewed the NPO Status.   I have reviewed the Medications.     Review of Systems  Anesthesia Hx:  No problems with previous Anesthesia  Denies Family Hx of Anesthesia complications.   Denies Personal Hx of Anesthesia complications.   Social:  Social Alcohol Use, Non-Smoker    Hematology/Oncology:         -- Anemia:   Cardiovascular:   Exercise tolerance: good Denies Hypertension.  Denies MI.   Denies CABG/stent.   Denies CHF.    Pulmonary:   Sleep Apnea    Hepatic/GI:   PUD,    Neurological:  Neurology Normal    Endocrine:   Hypothyroidism        Physical Exam  General: Cooperative and Alert    Airway:  Mallampati: III   Mouth Opening: < 3 cm  TM Distance: Normal  Tongue: Large  Neck ROM: Normal ROM    Dental:  Intact          Lab Results   Component Value Date    WBC 5.0 2023    HGB 12.1 2023    HCT 39.1 2023    MCV 84 2023     2023     ,    Chemistry        Component Value Date/Time      03/22/2023 0945    K 4.0 03/22/2023 0945     03/22/2023 0945    CO2 27 03/22/2023 0945    BUN 13 03/22/2023 0945    CREATININE 0.65 03/22/2023 0945    GLU 84 03/22/2023 0945        Component Value Date/Time    CALCIUM 9.3 03/22/2023 0945    ALKPHOS 64 06/21/2019 0909    AST 17 03/22/2023 0945    ALT 14 03/22/2023 0945    BILITOT 0.5 03/22/2023 0945    EGFRNONAA 105 10/19/2021 1000            Anesthesia Plan  Type of Anesthesia, risks & benefits discussed:    Anesthesia Type: MAC, Gen Natural Airway  Intra-op Monitoring Plan: Standard ASA Monitors  Induction:  IV  Informed Consent: Informed consent signed with the Patient and all parties understand the risks and agree with anesthesia plan.  All questions answered.   ASA Score: 2  Day of Surgery Review of History & Physical: H&P Update referred to the surgeon/provider.    Ready For Surgery From Anesthesia Perspective.     .

## 2023-07-27 NOTE — TRANSFER OF CARE
"Anesthesia Transfer of Care Note    Patient: Jolene Monaco    Procedure(s) Performed: Procedure(s) (LRB):  COLONOSCOPY 7/5-Card. clear. in media (N/A)    Patient location: PACU    Anesthesia Type: MAC    Transport from OR: Transported from OR on room air with adequate spontaneous ventilation    Post pain: adequate analgesia    Post assessment: no apparent anesthetic complications    Post vital signs: stable    Level of consciousness: awake    Nausea/Vomiting: no nausea/vomiting    Complications: none    Transfer of care protocol was followed      Last vitals:   Visit Vitals  BP (!) 166/98 (BP Location: Left arm, Patient Position: Lying)   Pulse 72   Temp 36.7 °C (98.1 °F) (Temporal)   Resp 16   Ht 5' 6" (1.676 m)   Wt (!) 162.8 kg (359 lb)   SpO2 99%   Breastfeeding No   BMI 57.94 kg/m²     "

## 2023-07-27 NOTE — PROVATION PATIENT INSTRUCTIONS
Discharge Summary/Instructions after an Endoscopic Procedure  Patient Name: Jolene Monaco  Patient MRN: [id number]  Patient YOB: 1975 Thursday, July 27, 2023 Luciano Lovett MD  Dear patient,  As a result of recent federal legislation (The Federal Cures Act), you may   receive lab or pathology results from your procedure in your [facility]   account before your physician is able to contact you. Your physician or   their representative will relay the results to you with their   recommendations at their soonest availability.  Thank you,  RESTRICTIONS:  During your procedure today, you received medications for sedation.  These   medications may affect your judgment, balance and coordination.  Therefore,   for 24 hours, you have the following restrictions:   - DO NOT drive a car, operate machinery, make legal/financial decisions,   sign important papers or drink alcohol.    ACTIVITY:  Today: no heavy lifting, straining or running due to procedural   sedation/anesthesia.  The following day: return to full activity including work.  DIET:  Eat and drink normally unless instructed otherwise.     TREATMENT FOR COMMON SIDE EFFECTS:  - Mild abdominal pain, nausea, belching, bloating or excessive gas:  rest,   eat lightly and use a heating pad.  - Sore Throat: treat with throat lozenges and/or gargle with warm salt   water.  - Because air was used during the procedure, expelling large amounts of air   from your rectum or belching is normal.  - If a bowel prep was taken, you may not have a bowel movement for 1-3 days.    This is normal.  SYMPTOMS TO WATCH FOR AND REPORT TO YOUR PHYSICIAN:  1. Abdominal pain or bloating, other than gas cramps.  2. Chest pain.  3. Back pain.  4. Signs of infection such as: chills or fever occurring within 24 hours   after the procedure.  5. Rectal bleeding, which would show as bright red, maroon, or black stools.   (A tablespoon of blood from the rectum is not serious, especially  if   hemorrhoids are present.)  6. Vomiting.  7. Weakness or dizziness.  GO DIRECTLY TO THE NEAREST EMERGENCY ROOM IF YOU HAVE ANY OF THE FOLLOWING:      Difficulty breathing              Chills and/or fever over 101 F   Persistent vomiting and/or vomiting blood   Severe abdominal pain   Severe chest pain   Black, tarry stools   Bleeding- more than one tablespoon   Any other symptom or condition that you feel may need urgent attention  Your doctor recommends these additional instructions:  If any biopsies were taken, your doctors clinic will contact you in 1 to 2   weeks with any results.  - Discharge patient to home.   - Resume previous diet.   - Continue present medications.   - Await pathology results.   - Repeat colonoscopy in 5 years for surveillance.   - Return to referring physician as previously scheduled.   - Patient has a contact number available for emergencies.  The signs and   symptoms of potential delayed complications were discussed with the   patient.  Return to normal activities tomorrow.  Written discharge   instructions were provided to the patient.  For questions, problems or results please call your physician Luciano Lovett MD at Work:  [phone]  If you have any questions about the above instructions, call the GI   department at [phone] or call the endoscopy unit at [phone]   from 7am until 3 pm.  [facility] [place], EMERGENCY ROOM PHONE NUMBER:   [phone]  IF A COMPLICATION OR EMERGENCY SITUATION ARISES AND YOU ARE UNABLE TO REACH   YOUR PHYSICIAN - GO DIRECTLY TO THE EMERGENCY ROOM.  I have read or have had read to me these discharge instructions for my   procedure and have received a written copy.  I understand these   instructions and will follow-up with my physician if I have any questions.     __________________________________       _____________________________________  Nurse Signature                                           Patient/Designated   Responsible Party Signature  MD Luciano Hare MD  7/27/2023 10:46:53 AM  This report has been verified and signed electronically.  Dear patient,  As a result of recent federal legislation (The Federal Cures Act), you may   receive lab or pathology results from your procedure in your [facility]   account before your physician is able to contact you. Your physician or   their representative will relay the results to you with their   recommendations at their soonest availability.  Thank you,  PROVATION

## 2023-07-27 NOTE — DISCHARGE SUMMARY
[facility] - Endoscopy (Hospital)  Discharge Note  Short Stay    Procedure(s) (LRB):  COLONOSCOPY 7/5-Card. clear. in media (N/A)      OUTCOME: Patient tolerated treatment/procedure well without complication and is now ready for discharge.    DISPOSITION: Home or Self Care    FINAL DIAGNOSIS:  Colon cancer screening    FOLLOWUP: With primary care provider    DISCHARGE INSTRUCTIONS:  No discharge procedures on file.     TIME SPENT ON DISCHARGE: 20 minutes

## 2023-07-27 NOTE — ANESTHESIA POSTPROCEDURE EVALUATION
Anesthesia Post Evaluation    Patient: Jolene Monaco    Procedure(s) Performed: Procedure(s) (LRB):  COLONOSCOPY 7/5-Card. clear. in media (N/A)    Final Anesthesia Type: MAC      Patient location during evaluation: GI PACU  Patient participation: Yes- Able to Participate  Level of consciousness: awake  Post-procedure vital signs: reviewed and stable  Pain management: adequate  Airway patency: patent    PONV status at discharge: No PONV  Anesthetic complications: no      Cardiovascular status: blood pressure returned to baseline and hemodynamically stable  Respiratory status: unassisted and spontaneous ventilation  Hydration status: euvolemic  Follow-up not needed.          Vitals Value Taken Time   /86 07/27/23 1048   Temp 36.5 °C (97.7 °F) 07/27/23 1048   Pulse 79 07/27/23 1048   Resp 14 07/27/23 1048   SpO2 99 % 07/27/23 1048         No case tracking events are documented in the log.      Pain/Talya Score: Talya Score: 8 (7/27/2023 10:48 AM)

## 2023-07-27 NOTE — H&P
Endoscopy History and Physical    PCP - Minal Isabel MD  Referring Physician - Josee Mariscal RN  No address on file      ASA - per anesthesia  Mallampati - per anesthesia  History of Anesthesia problems - no  Family history Anesthesia problems -  no   Plan of anesthesia - General    HPI  47 y.o. female    Planned Procedure: Colonoscopy  Diagnosis: screening for colon cancer  Chief Complaint: Same as above    Personnel H/o colon polyps:no  FH of colon cancer:no  Anticoagulation:no      ROS:  Constitutional: No fevers, chills, No weight loss  CV: No chest pain  Pulm: No cough, No shortness of breath  GI: see HPI    Medical History:  has a past medical history of Hypothyroidism, Insulin resistance, PERRI (obstructive sleep apnea), and Vitamin D deficiency.    Surgical History:  has a past surgical history that includes  section; Endometrial ablation; Carpal tunnel release (Left); and Eye surgery (2019).    Family History: family history includes Cancer in her maternal grandfather; Diabetes in her father and paternal grandmother; Heart disease in her father; Hypertension in her father..    Social History:  reports that she has never smoked. She has never used smokeless tobacco. She reports current alcohol use. She reports that she does not use drugs.    Review of patient's allergies indicates:   Allergen Reactions    Levaquin [levofloxacin]        Medications:   Medications Prior to Admission   Medication Sig Dispense Refill Last Dose    aspirin (ECOTRIN) 81 MG EC tablet Take 81 mg by mouth once daily.   Past Week    desonide 0.05% (DESOWEN) 0.05 % Oint APPLY TOPICALLY TWICE DAILY AS NEEDED 60 g 2 Past Week    ergocalciferol (ERGOCALCIFEROL) 50,000 unit Cap Take 1 capsule (50,000 Units total) by mouth every 7 days. 12 capsule 1 Past Month    ferrous sulfate (FEOSOL) 325 mg (65 mg iron) Tab tablet Take 1 tablet (325 mg total) by mouth daily with breakfast. 90 tablet 1 Past Month    levothyroxine  (SYNTHROID) 25 MCG tablet Take 1 tablet (25 mcg total) by mouth before breakfast. 90 tablet 1 Past Week    metFORMIN (GLUCOPHAGE-XR) 500 MG ER 24hr tablet Take 2 tablets (1,000 mg total) by mouth Daily. Take 2 tablets (1,000 mg total) by mouth once daily at 6am. 180 tablet 1 Past Week       Physical Exam:    Vital Signs:   Vitals:    07/27/23 1014   BP: (!) 166/98   Pulse: 72   Resp: 16   Temp: 98.1 °F (36.7 °C)       General Appearance: Well appearing in no acute distress  Abdomen: Soft, non tender, non distended with normal bowel sounds, no masses    Labs:  Lab Results   Component Value Date    WBC 5.0 03/22/2023    HGB 12.1 03/22/2023    HCT 39.1 03/22/2023     03/22/2023    CHOL 166 03/22/2023    TRIG 49 03/22/2023    HDL 67 03/22/2023    ALT 14 03/22/2023    AST 17 03/22/2023     03/22/2023    K 4.0 03/22/2023     03/22/2023    CREATININE 0.65 03/22/2023    BUN 13 03/22/2023    CO2 27 03/22/2023    TSH 5.490 (H) 03/22/2023    HGBA1C 5.6 03/22/2023    MICROALBUR 4.0 03/22/2023       I have explained the risks and benefits of this endoscopic procedure to the patient including but not limited to bleeding, inflammation, infection, perforation, and death.    SEDATION PLAN: per anesthesia       History reviewed, vital signs satisfactory, cardiopulmonary status satisfactory, sedation options, risks and plans have been discussed with the patient  All their questions were answered and the patient agrees to the sedation procedures as planned and the patient is deemed an appropriate candidate for the sedation as planned.     The risks, benefits and alternatives of the procedure were discussed with the patient in detail. This discussion was had in the presence of endoscopy staff. The risks include, risks of adverse reaction to sedation requiring the use of reversal agents, bleeding requiring blood transfusion, perforation requiring surgical intervention and technical failure. Other risks include  aspiration leading to respiratory distress and respiratory failure resulting in endotracheal intubation and mechanical ventilation including death. If anesthesia is being utilized for this procedure, it is up to the anesthesiologist to determine airway safety including elective endotracheal intubation. Questions were answered, they agree to proceed. There was no language barriers.       Procedure explained to patient, informed consent obtained and placed in chart.       Luciano Lovett MD

## 2023-07-28 VITALS
BODY MASS INDEX: 47.09 KG/M2 | RESPIRATION RATE: 18 BRPM | HEART RATE: 76 BPM | SYSTOLIC BLOOD PRESSURE: 141 MMHG | TEMPERATURE: 98 F | OXYGEN SATURATION: 97 % | HEIGHT: 66 IN | WEIGHT: 293 LBS | DIASTOLIC BLOOD PRESSURE: 86 MMHG

## 2023-07-31 LAB
FINAL PATHOLOGIC DIAGNOSIS: NORMAL
GROSS: NORMAL
Lab: NORMAL

## 2024-11-26 ENCOUNTER — TELEPHONE (OUTPATIENT)
Dept: PULMONOLOGY | Facility: CLINIC | Age: 49
End: 2024-11-26
Payer: COMMERCIAL

## 2024-11-26 NOTE — TELEPHONE ENCOUNTER
----- Message from Dorinda sent at 11/26/2024  1:30 PM CST -----  Contact: self  [phone]  Type:  Needs Medical Advice    Who Called: Jolene  Symptoms (please be specific): sleep apnea  How long has patient had these symptoms:    Pharmacy name and phone #:    Would the patient rather a call back or a response via [facility]? Call back   Best Call Back Number:  [phone]  Additional Information: patient called in this afternoon requesting a call back to discuss her sleep apnea and does she need to do another sleep study. Please call back  [phone]. Thanks maria

## 2024-11-27 ENCOUNTER — OFFICE VISIT (OUTPATIENT)
Dept: PULMONOLOGY | Facility: CLINIC | Age: 49
End: 2024-11-27
Payer: COMMERCIAL

## 2024-11-27 VITALS
OXYGEN SATURATION: 97 % | HEIGHT: 66 IN | WEIGHT: 293 LBS | RESPIRATION RATE: 18 BRPM | DIASTOLIC BLOOD PRESSURE: 72 MMHG | SYSTOLIC BLOOD PRESSURE: 124 MMHG | BODY MASS INDEX: 47.09 KG/M2 | HEART RATE: 80 BPM

## 2024-11-27 DIAGNOSIS — G47.33 OSA ON CPAP: ICD-10-CM

## 2024-11-27 DIAGNOSIS — E66.01 MORBID OBESITY: Primary | ICD-10-CM

## 2024-11-27 DIAGNOSIS — F51.01 PRIMARY INSOMNIA: ICD-10-CM

## 2024-11-27 DIAGNOSIS — E03.8 OTHER SPECIFIED HYPOTHYROIDISM: ICD-10-CM

## 2024-11-27 PROCEDURE — 99999 PR PBB SHADOW E&M-EST. PATIENT-LVL V: CPT | Mod: PBBFAC,,,

## 2024-11-27 NOTE — PROGRESS NOTES
Subjective:      Patient ID: Jolene Monaco is a 49 y.o. female.    Chief Complaint: Sleep Apnea      11/27/2024  Jolene Monaco 49 y.o.  Patient presents with Sleep Apnea  Here to see if she needs a new sleep study  Says she never got a call for a follow up and was not sure if she still had sleep apnea or needed to treat it with the CPAP  Thought sleep symptoms was related to grief of father passing at the time  Admits to not wearing the device recently.   Unable to obtain download due to device working, not turning on.   Reports when she did wear the device she  was not sure if it helped. Felt unsure if it wasn't working properly.  Primary sleep complaint non restful sleep and daytime sleepiness.   Snores, witnessed apnea, take nap when sitting for long periods, morning HA and dry mouth  Recently went up on synthroid dose was feeling more tired.   Issues losing weight, trailed several medication for weight loss, doing arm chair yoga and walking stairs for exercise.  Bedtime: 10 pm; 30 minute onset  Wake time: 5:30- 6 am  12/15/2021 HST: SEVERE OBSTRUCTIVE SLEEP APNEA: AHI 37.7/hr    Denies drowsy driving.   Ep: 0                 Interval HPI History:  5/24/2022  Chief Complaint: Sleep Apnea  45yo female here for PERRI on CPAP follow up  Using AutoPAP, FFM  Patient states improved symptoms with use of CPAP. Sleeping more soundly. Waking up feeling more refreshed. Improved daytime sleepiness.  Using nightly  Wants to switch to nasal pillow  definitely wants to keep using CPAP  Gordon improved from 21 to 0       11/17/2022  Chief Complaint: Sleep Apnea  Jolene Monaco is 46 y.o.  Referred by Minal Isabel MD for PERRI/sleep eval  Patient complains of tossing and turning at night, fragmented sleep  Complains of daytime somnolence, fatigue, snoring  Takes lunesta for sleep and recently added on trazodone after father passed and symptoms worsened  In bed at 10pm and up at 5am  No restless legs  No suspicion for  narcolepsy  No alcohol  Patient is a highschool teacher       BP Readings from Last 3 Encounters:   11/17/21 (!) 140/86   10/19/21 136/88   10/29/20 132/80     Snoring / Sleep:      [place] Questionnaire (validated PERRI screening questionnaire)    yes -- Snoring/apnea    yes -- Fatigue    Body mass index is 55.18 kg/m².  (>25 is overweight, >30 is obese)    Blood Pressure = Hypertension  (PreHTN 120-139/80-89, Stg1 140-159/90-99, Stg2 >160/>100)  [place] = 3 of three PERRI categories are positive (high risk is 2-3 positive categories)      [place] Sleepiness Scale TOTAL = 21    (validated sleepiness questionnaire with a higher score indicating greater sleepiness; range 0-24)    Review of Systems   Constitutional:  Negative for fever, chills and night sweats.   HENT: Negative.     Eyes: Negative.    Respiratory:  Positive for apnea, snoring and somnolence. Negative for cough, hemoptysis, choking, wheezing and dyspnea on extertion.    Genitourinary: Negative.    Endocrine: endocrine negative    Musculoskeletal: Negative.    Neurological:  Positive for headaches (morning).   Psychiatric/Behavioral:  Positive for sleep disturbance.        Past Medical History:   Diagnosis Date    Hypothyroidism     Insulin resistance     PERRI (obstructive sleep apnea)     Vitamin D deficiency      Tobacco Use: Low Risk  (11/27/2024)    Patient History     Smoking Tobacco Use: Never     Smokeless Tobacco Use: Never     Passive Exposure: Not on file      Current Outpatient Medications   Medication Sig    aspirin (ECOTRIN) 81 MG EC tablet Take 81 mg by mouth once daily.    cholecalciferol, vitamin D3, 125 mcg (5,000 unit) capsule Take 1 capsule (5,000 Units total) by mouth once daily.    desonide 0.05% (DESOWEN) 0.05 % Oint APPLY TOPICALLY TWICE DAILY AS NEEDED    levocetirizine (XYZAL) 5 MG tablet Take 1 tablet (5 mg total) by mouth every evening.    levothyroxine (SYNTHROID) 200 MCG tablet TAKE 1 TABLET(200 MCG) BY MOUTH DAILY BEFORE BREAKFAST     levothyroxine (SYNTHROID) 25 MCG tablet Take 1 tablet (25 mcg total) by mouth once daily.    mupirocin (BACTROBAN) 2 % ointment Apply topically 3 (three) times daily.    prednisoLONE acetate (PRED FORTE) 1 % DrpS Place 1 drop into the right eye 4 (four) times daily.    tirzepatide (MOUNJARO) 5 mg/0.5 mL PnIj Inject 5 mg into the skin every 7 days. (Patient not taking: Reported on 11/27/2024)   Last reviewed on 11/27/2024  1:56 PM by Darlene Kaminski MA      Objective:     Vitals:    11/27/24 1051   BP: 124/72   Pulse: 80   Resp: 18      Body mass index is 58 kg/m².     Physical Exam   Constitutional: She is oriented to person, place, and time. No distress.   HENT:   Head: Normocephalic and atraumatic.   Nose: Nose normal.   Mouth/Throat: Oropharynx is clear and moist.   Neck: Trachea normal and phonation normal.   Cardiovascular: Normal rate, regular rhythm, S1 normal, S2 normal, normal heart sounds and intact distal pulses.   No murmur heard.  Pulmonary/Chest: Normal expansion, symmetric chest wall expansion, effort normal and breath sounds normal. There is normal air entry. No accessory muscle usage or stridor. No respiratory distress. She has no wheezes. She has no rhonchi. She has no rales.   Musculoskeletal:      Cervical back: Normal range of motion.      Left lower leg: Edema present.   Lymphadenopathy: No supraclavicular adenopathy is present.     She has no cervical adenopathy.        Right cervical: No superficial cervical adenopathy present.       Left cervical: No superficial cervical adenopathy present.     She has no axillary adenopathy.   Neurological: She is alert and oriented to person, place, and time.   Skin: Skin is warm and dry. Capillary refill takes less than 2 seconds. No cyanosis. Nails show no clubbing.   Psychiatric: She has a normal mood and affect. Her speech is normal and behavior is normal. Mood, memory, affect, judgment and thought content normal.   Vitals reviewed.           "11/27/2024     1:56 PM 11/27/2024    10:51 AM 10/17/2024     3:16 PM 6/3/2024     9:12 AM 5/14/2024    10:32 AM 5/9/2024     3:36 PM 3/22/2024    11:28 AM   Pulmonary Function Tests   SpO2 98 % 97 % 98 % 98 % 98 % 99 % 97 %   Height 5' 6" (1.676 m) 5' 6" (1.676 m) 5' 6" (1.676 m) 5' 6" (1.676 m) 5' 6" (1.676 m) 5' 6" (1.676 m) 5' 6" (1.676 m)   Weight 162.5 kg (358 lb 3.2 oz) 163 kg (359 lb 5.6 oz) 163.6 kg (360 lb 9.6 oz) 161.4 kg (355 lb 12.8 oz) 165.1 kg (364 lb) 165.1 kg (364 lb) 162.8 kg (359 lb)   BMI (Calculated) 57.8 58 58.2 57.5 58.8 58.8 58       X-Ray Chest PA And Lateral  Narrative: EXAMINATION:  XR CHEST PA AND LATERAL    CLINICAL HISTORY:  Cough, unspecified    TECHNIQUE:  PA and lateral views of the chest were performed.    COMPARISON:  None    FINDINGS:  Lungs are clear without focal consolidation, edema, or mass.  No pneumothorax or pleural effusion.  Mild cardiomegaly.  Pulmonary vasculature is mildly prominent bilaterally.  No acute osseous abnormality.  Impression: As above.    Electronically signed by: Marvel Guadarrama  Date:    11/17/2021  Time:    16:44       Personal Diagnostic Review    Home Sleep Studies     Date/Time: 12/15/2021 8:00 AM  Performed by: Nelson Keller MD  Authorized by: Herminia Millan, PA-C   SEVERE OBSTRUCTIVE SLEEP APNEA with overall AHI 37.7/hr ( 296 events)  Night #1 of 4 nights.  Oxygen desaturation: < 70 %. SpO2 between 70% to 79% for 13 min.  SpO2 was below 90% for 18% study.  Patient snored 100% time above 50 .  Heart rate range: 52 bpm -136 bpm  REC's:  Please refer to sleep disorders clinic fomr management  APAP at 6-20 cm WP using mask of choice with heated humidification may be option if Titration not feasible  with close follow up monitoring residual AHI and overnight oximetry  Weight loss/management. with regular exercise per direction of physician.  Avoid drowsy driving.  Follow up in sleep clinic to maximize adherence and ensure resolution of " symptoms.  [employer] practice parameter of CPAP would be the guideline recommendation of  choice. Other  therapies may include ENT procedures were appropriate, significant weight loss. Inspire hypoglossal nerve  stimulator and nasal  PROVENT or mandibular advancement device may also be considered. Close follow up to ensure resolution of  symptoms.       Assessment/Plan:     1. Morbid obesity  Assessment & Plan:  Reports issues losing weight, trailed several medication for weight loss  Doing arm chair yoga and walking stairs for exercise.    Plan:  Weight managment      Orders:  -     Ambulatory referral/consult to Weight Management Program; Future; Expected date: 12/04/2024    2. PERRI on CPAP  Assessment & Plan:  -Reviewed with patient 12/15/2021 HST: SEVERE OBSTRUCTIVE SLEEP APNEA: AHI 37.7/hr    -Admits to not wearing the device recently.   -Unable to obtain download due to device working, not turning on.   -Reports when she did wear the device she  was not sure if it helped. Felt unsure if it wasn't working properly.  -Last download AHI was 3.1  -Ep: 0    Plan:  -RT to schedule visit to evaluate device   -cpap Titration ordered      Orders:  -     BiPAP/CPAP Titration ((Must have dx of PERRI from previous sleep study); Future    3. Primary insomnia  Overview:  start lunesta 2 mg a day    Assessment & Plan:  No sleep aid per patient  Prior Lunesta 2mg rx by PCP  Severe PERRI per 12/2021 HST AHI 37.7        4. Other specified hypothyroidism  Overview:  Overview:   ICD-10 Transition    Assessment & Plan:  Recently went up on synthroid dose was feeling more tired.     Advised to continue therapies and follow up management with prescribing provider             Outpatient Encounter Medications as of 11/27/2024   Medication Sig Dispense Refill    aspirin (ECOTRIN) 81 MG EC tablet Take 81 mg by mouth once daily.      cholecalciferol, vitamin D3, 125 mcg (5,000 unit) capsule Take 1 capsule (5,000 Units  total) by mouth once daily. 90 capsule 1    desonide 0.05% (DESOWEN) 0.05 % Oint APPLY TOPICALLY TWICE DAILY AS NEEDED 60 g 2    levocetirizine (XYZAL) 5 MG tablet Take 1 tablet (5 mg total) by mouth every evening. 30 tablet 11    levothyroxine (SYNTHROID) 200 MCG tablet TAKE 1 TABLET(200 MCG) BY MOUTH DAILY BEFORE BREAKFAST 90 tablet 1    levothyroxine (SYNTHROID) 25 MCG tablet Take 1 tablet (25 mcg total) by mouth once daily. 90 tablet 1    mupirocin (BACTROBAN) 2 % ointment Apply topically 3 (three) times daily. 30 g 2    prednisoLONE acetate (PRED FORTE) 1 % DrpS Place 1 drop into the right eye 4 (four) times daily.      tirzepatide (MOUNJARO) 5 mg/0.5 mL PnIj Inject 5 mg into the skin every 7 days. (Patient not taking: Reported on 11/27/2024) 2 mL 2     No facility-administered encounter medications on file as of 11/27/2024.     Orders Placed This Encounter   Procedures    Ambulatory referral/consult to Weight Management Program     Standing Status:   Future     Standing Expiration Date:   12/27/2025     Referral Priority:   Routine     Referral Type:   Consultation     Referral Reason:   Specialty Services Required     Requested Specialty:   General Surgery     Number of Visits Requested:   1    BiPAP/CPAP Titration ((Must have dx of PERRI from previous sleep study)     Standing Status:   Future     Standing Expiration Date:   11/27/2025     Order Specific Question:   Titration Type:     Answer:   CPAP     Follow up in about 6 weeks (around 1/8/2025), or new cpap device/loaner?,or lab titration, weightloss.  Note has been documented by Doris Wilkerson on 11/29/2024  Pulmonary Disease

## 2024-11-27 NOTE — PATIENT INSTRUCTIONS
Sleep Hygiene: The healthy habits of Good sleep    www.sleepeducation.Ghost    1. Don't go to bed unless you are sleepy: If you are not sleepy at bedtime , then do something else. Read a book, listen to soft music or browse through a magazine. Find something relaxing , but not stimulating to take your mind off worries about sleep, this will relax your body and distract your mind.    2. If you are not asleep after 20 minutes, then get out of bed.  Find something else to do that will make you feel relaxed. If you can, do this in another room. Your bedroom should be where you go to sleep. It is not a place to go when you are bored. Once you feel sleepy again, go back to bed.    3. Begin rituals than help you relax each night before bed.  This can include such things like a warm bath, light snack or a few minutes of reading.    4. Get up at the same time every morning.  Do this even on weekends and holidays.    5. Get a full night's sleep on a regular basis.  Get enough sleep so that you feel well rested nearly every day.    6. Avoid taking naps if you can.  If you must take a nap, try to keep it short (less than one hour). Never take a nap after 3 pm.    7. Keep a regular schedule.  Regular times for meals, medications, chores and other activities help keep the inner body clock running smoothly.    8. Don't read, write, eat, watch TV, talk on the phone, play with electronics or play cards in bed.    9. Do not have any caffeine after lunch.    10. Do not have a beer, a glass of wine, or any other alcohol within six hours of your bedtime.    11. Do not have a cigarette or any other source of nicotine before bedtime.    12. Do not go to bed hungry, but don't eat a big meal near bedtime either.    13. Avoid any tough exercise within six hours of your bedtime.  You should exercise on a regular basis, but do it earlier in the day,(talk to your doctor before you begin an exercise program)    14.  Avoid sleeping pills, or use  them cautiously.  Most doctors do not prescribe sleeping pills for periods of more than hree weeks. Do not drink alcohol while taking sleeping pills.    15. Try to get rid of or deal with things that make you worry.  If you are unable to do this, then find a time during the day to get all your worries out of your system. Your bed is a place to rest, not a place to worry.    16. Make you bedroom quiet, dark and a little bit cool.  An easy way to remember this; it should remind you of a cave, while this may not sound romantic, it seems to work for bats. Bats are champion sleepers. They get about 16 hours of sleep each day. Maybe it's because they sleep in dark , cool caves.    Https://www.youDang Leube.com/watch?v=8XfcJzFw1iH    Https://www.youDang Leube.com/watch?v=DeQRxXk8oeh    Https://www.youtube.com/watch?v=_wXgyZvkHmU    Https://www.youtube.com/watch?v=T0ZrNmqB5qY    https://www.youtube.com/watch?v=pwNMvUXTgDY    Thank you for using [facility] and your visit to our facility.  To rate you experience with Dr Nelson Keller please click on link below    [email or web address]    Rate your Physician

## 2024-11-29 NOTE — ASSESSMENT & PLAN NOTE
Recently went up on synthroid dose was feeling more tired.     Advised to continue therapies and follow up management with prescribing provider

## 2024-11-29 NOTE — ASSESSMENT & PLAN NOTE
Reports issues losing weight, trailed several medication for weight loss  Doing arm chair yoga and walking stairs for exercise.    Plan:  Weight managment

## 2024-11-29 NOTE — ASSESSMENT & PLAN NOTE
-Reviewed with patient 12/15/2021 HST: SEVERE OBSTRUCTIVE SLEEP APNEA: AHI 37.7/hr    -Admits to not wearing the device recently.   -Unable to obtain download due to device working, not turning on.   -Reports when she did wear the device she  was not sure if it helped. Felt unsure if it wasn't working properly.  -Last download AHI was 3.1  -Ep: 0    Plan:  -RT to schedule visit to evaluate device   -cpap Titration ordered

## 2024-12-02 ENCOUNTER — DOCUMENTATION ONLY (OUTPATIENT)
Dept: PULMONOLOGY | Facility: CLINIC | Age: 49
End: 2024-12-02
Payer: COMMERCIAL

## 2024-12-02 ENCOUNTER — PATIENT MESSAGE (OUTPATIENT)
Dept: PULMONOLOGY | Facility: CLINIC | Age: 49
End: 2024-12-02
Payer: COMMERCIAL

## 2024-12-02 DIAGNOSIS — G47.33 OSA (OBSTRUCTIVE SLEEP APNEA): Primary | ICD-10-CM

## 2024-12-18 ENCOUNTER — TELEPHONE (OUTPATIENT)
Dept: PULMONOLOGY | Facility: CLINIC | Age: 49
End: 2024-12-18
Payer: COMMERCIAL

## 2024-12-18 NOTE — TELEPHONE ENCOUNTER
----- Message from LIU Bailon sent at 12/18/2024  2:34 PM CST -----  Regarding: Sleep Study  Can you send a message to this patient about doing their sleep study or if they are still interested in being evaluated for sleep apnea.     Thank you  ----- Message -----  From: Terry Portillo  Sent: 12/18/2024   7:48 AM CST  To: LIU Henao

## 2024-12-18 NOTE — TELEPHONE ENCOUNTER
Attempt to contact pt in regards to sleep study test being scheduled. Left detailed vm stating sleep lab number.

## 2025-02-13 ENCOUNTER — TELEPHONE (OUTPATIENT)
Dept: BARIATRICS | Facility: CLINIC | Age: 50
End: 2025-02-13
Payer: COMMERCIAL

## 2025-02-13 NOTE — TELEPHONE ENCOUNTER
Called pt, introduced myself as [facility] coordinator. [facility] sx program explained to pt. Understanding stated. [facility] consult scheduled. Time and date approved per pt. [facility] nutrition, [facility] pt handbook, and [facility] obesity wellness handbook sent via portal. RN f/u updated.

## 2025-03-11 ENCOUNTER — TELEPHONE (OUTPATIENT)
Dept: BARIATRICS | Facility: CLINIC | Age: 50
End: 2025-03-11
Payer: COMMERCIAL

## 2025-03-11 NOTE — TELEPHONE ENCOUNTER
Called pt to assist w/rescheduling [facility] consult appt. Pt stated she was unable to talk at this time. Pt requested that I call her back.

## 2025-03-11 NOTE — TELEPHONE ENCOUNTER
Called pt to discuss rescheduling [facility] sx wkup. Pt stated she cancelled her previous consult appt d/t being sick. Pt stated she is uncertain if having sx will be the best thing for her. 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 [facility] sx wkup process thoroughly explained to pt, including possible virtual appts. Pt stated she was unaware of this option. Pt stated she would have difficulty traveling from [place] to [place] for appts d/t the child's school schedule. Pt stated she would need to talk to her support system before moving forward. Offered to call pt next to further discuss. Pt approved. All questions and concerns addressed. RN f/u updated.

## 2025-04-03 ENCOUNTER — TELEPHONE (OUTPATIENT)
Dept: BARIATRICS | Facility: CLINIC | Age: 50
End: 2025-04-03
Payer: COMMERCIAL

## 2025-04-03 NOTE — TELEPHONE ENCOUNTER
Chart reviewed. Called, no answer, LVM w/cb name and number. Will send portal message. RN f/u updated.

## 2025-06-02 PROBLEM — I10 ESSENTIAL HYPERTENSION, MALIGNANT: Status: ACTIVE | Noted: 2025-06-02

## 2025-06-03 ENCOUNTER — TELEPHONE (OUTPATIENT)
Dept: BARIATRICS | Facility: CLINIC | Age: 50
End: 2025-06-03
Payer: COMMERCIAL